# Patient Record
Sex: FEMALE | Race: WHITE | NOT HISPANIC OR LATINO | Employment: OTHER | URBAN - METROPOLITAN AREA
[De-identification: names, ages, dates, MRNs, and addresses within clinical notes are randomized per-mention and may not be internally consistent; named-entity substitution may affect disease eponyms.]

---

## 2017-03-21 ENCOUNTER — OFFICE VISIT (OUTPATIENT)
Dept: PSYCHIATRY | Facility: HOSPITAL | Age: 62
End: 2017-03-21

## 2017-03-21 DIAGNOSIS — F43.10 POST TRAUMATIC STRESS DISORDER (PTSD): Primary | ICD-10-CM

## 2017-03-21 PROCEDURE — 90834 PSYTX W PT 45 MINUTES: CPT | Performed by: COUNSELOR

## 2017-03-21 NOTE — PROGRESS NOTES
"Pt had 1:1 session [id number]. Pt's affect was very sad and tearful. Pt discussed how family members were treating her eg felt she was taking sides with her soon to be ex-hus and saying mean things to her. Discussed how it was okay to set boundaries with family members. Pt stated she tries to stay active, but has difficulty at times. Discussed if pt thought she needed meds to help. Pt stated she didn't think so. Pt stated she is not isolating or having difficulty getting out of be, but does cry. Pt stated she attended the  Depression Group, but did not feel that was for her. Pt agreed to check out a divorce support group. Reinforced the need for pt to surround herself with others that are helpful and supportive. Pt stated she has moved in with her da and that has helped. Pt rated PTSD a \"7\" (1=best). Pt stated she does exercise and journal. Pt stated there are times she wished \"she wasn't alive,\" but no plans to hurt herself, one of more she \"doesn't want to have to deal with all of this.\" Discussed EMDR. Informed pt this office was closing. Pt stated she wanted to continue and do EMDR. Scheduled next session 3/27 @ Noon.    "

## 2017-03-21 NOTE — TREATMENT PLAN
Multi-Disciplinary Problems (from Behavioral Health Treatment Plan)    Active Problems     Problem: Post Traumatic Stress (Priority: --)  (Start Date: 03/21/17) (Resolve Date: --)    Problem Details:  The patient self-scales this problem as a 7 with 10 being the worst.         Goal Start Date End Date    Patient will process and move through trauma in a way that improves self regard and the patients ability to function optimally in the world around them. 03/21/17 --    Goal Details:  Progress toward goal:  Not appropriate to rate progress toward goal since this is the initial treatment plan.         Goal Intervention Frequency Start Date End Date    Assist patient in identifying ways that trauma has negatively impacted their view of themselves and the world. Weekly 03/21/17 --    Intervention Details:  Duration of treatment until until discharged.         Goal Intervention Frequency Start Date End Date    Process trauma in the context of the safe session environment. Weekly 03/21/17 --    Intervention Details:  Duration of treatment until until discharged.         Goal Intervention Frequency Start Date End Date    Develop a plan of behavior changes that will reduce the stress of the trauma. Weekly 03/21/17 --    Intervention Details:  Duration of treatment until until discharged.                            I have discussed and reviewed this treatment plan with the patient.  It has been printed for signatures.

## 2017-03-27 ENCOUNTER — APPOINTMENT (OUTPATIENT)
Dept: PSYCHIATRY | Facility: HOSPITAL | Age: 62
End: 2017-03-27

## 2017-03-29 ENCOUNTER — APPOINTMENT (OUTPATIENT)
Dept: PSYCHIATRY | Facility: HOSPITAL | Age: 62
End: 2017-03-29

## 2017-04-18 ENCOUNTER — APPOINTMENT (OUTPATIENT)
Dept: PSYCHIATRY | Facility: HOSPITAL | Age: 62
End: 2017-04-18

## 2018-10-15 ENCOUNTER — TRANSCRIBE ORDERS (OUTPATIENT)
Dept: ADMINISTRATIVE | Facility: HOSPITAL | Age: 63
End: 2018-10-15

## 2018-10-15 ENCOUNTER — HOSPITAL ENCOUNTER (OUTPATIENT)
Dept: GENERAL RADIOLOGY | Facility: HOSPITAL | Age: 63
Discharge: HOME | End: 2018-10-15
Admitting: INTERNAL MEDICINE

## 2018-10-15 DIAGNOSIS — M79.671 RIGHT FOOT PAIN: ICD-10-CM

## 2018-10-15 DIAGNOSIS — M79.671 RIGHT FOOT PAIN: Primary | ICD-10-CM

## 2018-10-15 PROCEDURE — 73630 X-RAY EXAM OF FOOT: CPT

## 2019-06-12 ENCOUNTER — OFFICE VISIT (OUTPATIENT)
Dept: FAMILY MEDICINE CLINIC | Age: 64
End: 2019-06-12
Payer: COMMERCIAL

## 2019-06-12 VITALS
BODY MASS INDEX: 36.7 KG/M2 | HEART RATE: 72 BPM | HEIGHT: 64 IN | OXYGEN SATURATION: 97 % | DIASTOLIC BLOOD PRESSURE: 70 MMHG | SYSTOLIC BLOOD PRESSURE: 128 MMHG | WEIGHT: 215 LBS | TEMPERATURE: 98.5 F

## 2019-06-12 DIAGNOSIS — I10 HYPERTENSION, UNSPECIFIED TYPE: ICD-10-CM

## 2019-06-12 DIAGNOSIS — G89.29 CHRONIC PAIN OF RIGHT KNEE: Primary | ICD-10-CM

## 2019-06-12 DIAGNOSIS — Z00.00 PHYSICAL EXAM: ICD-10-CM

## 2019-06-12 DIAGNOSIS — F32.A DEPRESSION, UNSPECIFIED DEPRESSION TYPE: ICD-10-CM

## 2019-06-12 DIAGNOSIS — M25.561 CHRONIC PAIN OF RIGHT KNEE: Primary | ICD-10-CM

## 2019-06-12 PROBLEM — R68.89 HEAT INTOLERANCE: Status: ACTIVE | Noted: 2018-01-03

## 2019-06-12 PROBLEM — E55.9 VITAMIN D DEFICIENCY: Status: ACTIVE | Noted: 2018-01-03

## 2019-06-12 PROBLEM — Z78.0 POSTMENOPAUSAL: Status: ACTIVE | Noted: 2018-01-03

## 2019-06-12 PROBLEM — F51.01 PRIMARY INSOMNIA: Status: ACTIVE | Noted: 2018-01-03

## 2019-06-12 PROCEDURE — G0444 DEPRESSION SCREEN ANNUAL: HCPCS | Performed by: NURSE PRACTITIONER

## 2019-06-12 PROCEDURE — 99204 OFFICE O/P NEW MOD 45 MIN: CPT | Performed by: NURSE PRACTITIONER

## 2019-06-12 RX ORDER — LISINOPRIL AND HYDROCHLOROTHIAZIDE 20; 12.5 MG/1; MG/1
1 TABLET ORAL DAILY
Qty: 30 TABLET | Refills: 3 | Status: SHIPPED | OUTPATIENT
Start: 2019-06-12 | End: 2019-12-30 | Stop reason: SDUPTHER

## 2019-06-12 RX ORDER — FLUTICASONE PROPIONATE 50 MCG
SPRAY, SUSPENSION (ML) NASAL
COMMUNITY

## 2019-06-12 RX ORDER — LISINOPRIL AND HYDROCHLOROTHIAZIDE 20; 12.5 MG/1; MG/1
1 TABLET ORAL
COMMUNITY
End: 2019-06-12 | Stop reason: SDUPTHER

## 2019-06-12 SDOH — ECONOMIC STABILITY: FOOD INSECURITY: WITHIN THE PAST 12 MONTHS, YOU WORRIED THAT YOUR FOOD WOULD RUN OUT BEFORE YOU GOT MONEY TO BUY MORE.: NEVER TRUE

## 2019-06-12 SDOH — ECONOMIC STABILITY: INCOME INSECURITY: HOW HARD IS IT FOR YOU TO PAY FOR THE VERY BASICS LIKE FOOD, HOUSING, MEDICAL CARE, AND HEATING?: NOT HARD AT ALL

## 2019-06-12 SDOH — ECONOMIC STABILITY: TRANSPORTATION INSECURITY
IN THE PAST 12 MONTHS, HAS LACK OF TRANSPORTATION KEPT YOU FROM MEETINGS, WORK, OR FROM GETTING THINGS NEEDED FOR DAILY LIVING?: NO

## 2019-06-12 SDOH — ECONOMIC STABILITY: TRANSPORTATION INSECURITY
IN THE PAST 12 MONTHS, HAS THE LACK OF TRANSPORTATION KEPT YOU FROM MEDICAL APPOINTMENTS OR FROM GETTING MEDICATIONS?: NO

## 2019-06-12 SDOH — ECONOMIC STABILITY: FOOD INSECURITY: WITHIN THE PAST 12 MONTHS, THE FOOD YOU BOUGHT JUST DIDN'T LAST AND YOU DIDN'T HAVE MONEY TO GET MORE.: NEVER TRUE

## 2019-06-12 ASSESSMENT — PATIENT HEALTH QUESTIONNAIRE - PHQ9
2. FEELING DOWN, DEPRESSED OR HOPELESS: 2
8. MOVING OR SPEAKING SO SLOWLY THAT OTHER PEOPLE COULD HAVE NOTICED. OR THE OPPOSITE, BEING SO FIGETY OR RESTLESS THAT YOU HAVE BEEN MOVING AROUND A LOT MORE THAN USUAL: 1
SUM OF ALL RESPONSES TO PHQ9 QUESTIONS 1 & 2: 3
7. TROUBLE CONCENTRATING ON THINGS, SUCH AS READING THE NEWSPAPER OR WATCHING TELEVISION: 1
4. FEELING TIRED OR HAVING LITTLE ENERGY: 1
1. LITTLE INTEREST OR PLEASURE IN DOING THINGS: 1
5. POOR APPETITE OR OVEREATING: 2
3. TROUBLE FALLING OR STAYING ASLEEP: 1
SUM OF ALL RESPONSES TO PHQ QUESTIONS 1-9: 10
SUM OF ALL RESPONSES TO PHQ QUESTIONS 1-9: 10
9. THOUGHTS THAT YOU WOULD BE BETTER OFF DEAD, OR OF HURTING YOURSELF: 0
6. FEELING BAD ABOUT YOURSELF - OR THAT YOU ARE A FAILURE OR HAVE LET YOURSELF OR YOUR FAMILY DOWN: 1

## 2019-06-12 ASSESSMENT — ANXIETY QUESTIONNAIRES
4. TROUBLE RELAXING: 1-SEVERAL DAYS
GAD7 TOTAL SCORE: 8
2. NOT BEING ABLE TO STOP OR CONTROL WORRYING: 2-OVER HALF THE DAYS
1. FEELING NERVOUS, ANXIOUS, OR ON EDGE: 1-SEVERAL DAYS
3. WORRYING TOO MUCH ABOUT DIFFERENT THINGS: 2-OVER HALF THE DAYS
7. FEELING AFRAID AS IF SOMETHING AWFUL MIGHT HAPPEN: 0-NOT AT ALL SURE
5. BEING SO RESTLESS THAT IT IS HARD TO SIT STILL: 1-SEVERAL DAYS
6. BECOMING EASILY ANNOYED OR IRRITABLE: 1-SEVERAL DAYS

## 2019-06-12 ASSESSMENT — ENCOUNTER SYMPTOMS: BACK PAIN: 1

## 2019-06-12 NOTE — PROGRESS NOTES
Patient ID: Rc Sainz is a 58 y.o. female who presents today for a Physical Exam.      HPI-this is a 58-year-old female patient wanting to establish care here today. She stated is been a while since she has been to the doctor. She told me she moved here a year ago from [place]. She has several health conditions:    1. She told me she has chronic right knee pain and has a previous torn meniscus that had to be repaired years ago. She has a history of previous gel injections to the knee and she is stating that she is needing another injection. I am going to be referring her to our Ortho and letting them decide what the appropriate treatment will be for her. She did tell me she takes Aleve for the pain but the pain is getting so bad she can hardly walk. 2.  She will be needing a physical exam today and has been a while since she has had labs so I will be ordering those for future she is not fasting today she stated she can come in at a future date to get the labs drawn. 3.  Hypertension- history of hypertension for many years she currently takes lisinopril 20 mg hydrochlorothiazide 12.5 mg 1 tablet daily. She states she is in control with her high blood pressure and has no issues. She denies any side effects from the medication and her blood pressure is good today. 4.  Depression-she also has a history of depression she currently takes Trintellix 10 mg daily but she told me she has been cutting the pill in half and taking a half a pill daily. She is recently  within the last year and she said is been rather tough. She does want to continue to keep decreasing the dose of this and I told her this is fine she will have to just see how she does so I told her she can cut the half of the half of the pill and just take a quarter of that and see how she does. We did the PHQ 9 and she is in the moderate level of depression. She does have anxiety she said she is a worry wart.     We None     Attends meetings of clubs or organizations: None     Relationship status: None    Intimate partner violence:     Fear of current or ex partner: None     Emotionally abused: None     Physically abused: None     Forced sexual activity: None   Other Topics Concern    None   Social History Narrative    None          Allergies   Allergen Reactions    Penicillins Hives    Sulfa Antibiotics Other (See Comments)     \"nodules\" on the legs. Current Outpatient Medications   Medication Sig Dispense Refill    fluticasone (FLONASE) 50 MCG/ACT nasal spray by Nasal route      lisinopril-hydrochlorothiazide (PRINZIDE;ZESTORETIC) 20-12.5 MG per tablet Take 1 tablet by mouth daily 30 tablet 3    VORTIoxetine (TRINTELLIX) 10 MG TABS tablet Take 1 tablet by mouth daily 30 tablet 0     No current facility-administered medications for this visit. The patient's past medical history, past surgical history, family history, medications, and allergies were all reviewed and updated at appropriate today. Review of Systems   Musculoskeletal: Positive for arthralgias, back pain, joint swelling (right knee at times) and myalgias. Psychiatric/Behavioral: Positive for dysphoric mood. All other systems reviewed and are negative. Physical Exam   Constitutional: She is oriented to person, place, and time. She appears well-developed and well-nourished. HENT:   Head: Normocephalic. Right Ear: External ear normal.   Left Ear: External ear normal.   Nose: Nose normal.   Mouth/Throat: Oropharynx is clear and moist.   Eyes: Pupils are equal, round, and reactive to light. Conjunctivae and EOM are normal.   Neck: Trachea normal and normal range of motion. Neck supple. No JVD present. Carotid bruit is not present. No thyromegaly present. Cardiovascular: Normal rate, regular rhythm and normal heart sounds. Pulmonary/Chest: Effort normal and breath sounds normal.   Abdominal: Soft.  Bowel sounds are normal. Musculoskeletal: Normal range of motion. Lymphadenopathy:     She has no cervical adenopathy. Neurological: She is alert and oriented to person, place, and time. She displays no tremor. Reflex Scores:       Patellar reflexes are 1+ on the right side and 1+ on the left side. Skin: Skin is warm and dry. Psychiatric: She has a normal mood and affect. Her behavior is normal. Judgment and thought content normal.   Nursing note and vitals reviewed. Assessment:  Encounter Diagnoses   Name Primary?  Chronic pain of right knee Yes    Physical exam     Hypertension, unspecified type     Depression, unspecified depression type        Controlled Substances Monitoring:  NA    Plan:  1. Chronic pain of right knee  - Charlotte Desai MD, Orthopedic Surgery, [facility]    2. Physical exam  - CBC Auto Differential; Future  - Comprehensive Metabolic Panel; Future  - Hemoglobin A1C; Future  - Hepatitis C Antibody; Future  - HIV Screen; Future  - Lipid Panel; Future  - TSH with Reflex; Future  - Vitamin D 25 Hydroxy; Future    3. Hypertension, unspecified type  - lisinopril-hydrochlorothiazide (PRINZIDE;ZESTORETIC) 20-12.5 MG per tablet; Take 1 tablet by mouth daily  Dispense: 30 tablet; Refill: 3    4. Depression, unspecified depression type  - VORTIoxetine (TRINTELLIX) 10 MG TABS tablet; Take 1 tablet by mouth daily  Dispense: 30 tablet; Refill: 0      Encouraged healthy diet, regular exercise and multivitamin daily. Labs checked per orders. Optho visit q 1-2 years. Colonoscopy if over age 48 or if family history was discussed, but she has had the [place]-guard last year. Pap encouraged yearly, mammo encouraged yearly.

## 2019-06-17 ENCOUNTER — OFFICE VISIT (OUTPATIENT)
Dept: ORTHOPEDIC SURGERY | Age: 64
End: 2019-06-17
Payer: COMMERCIAL

## 2019-06-17 VITALS
SYSTOLIC BLOOD PRESSURE: 125 MMHG | HEIGHT: 64 IN | HEART RATE: 76 BPM | DIASTOLIC BLOOD PRESSURE: 80 MMHG | BODY MASS INDEX: 36.7 KG/M2 | WEIGHT: 215 LBS

## 2019-06-17 DIAGNOSIS — M25.561 RIGHT KNEE PAIN, UNSPECIFIED CHRONICITY: ICD-10-CM

## 2019-06-17 DIAGNOSIS — M17.11 OSTEOARTHRITIS OF RIGHT KNEE, UNSPECIFIED OSTEOARTHRITIS TYPE: Primary | ICD-10-CM

## 2019-06-17 PROCEDURE — 20610 DRAIN/INJ JOINT/BURSA W/O US: CPT | Performed by: ORTHOPAEDIC SURGERY

## 2019-06-17 PROCEDURE — 99204 OFFICE O/P NEW MOD 45 MIN: CPT | Performed by: ORTHOPAEDIC SURGERY

## 2019-06-17 NOTE — PROGRESS NOTES
Date:  2019    Name:  Severiano Crete  Address:  [address] [facility] [address]    :  1955      Age:   58 y.o.    SSN:  [id number]      Medical Record Number:  [id number]    Reason for Visit:    Chief Complaint    Knee Pain (np right knee. 2nd opinion. )      DOS:2019     HPI: Severiano Crete is a 58 y.o. female here today for here today for evaluation of right knee. About 2 years ago she had meniscus surgery on her right knee by another surgeon and has had persistent right medial sided pain since. The pain has been progressively worsening. The knee pain is worse with stairs, long walks, and pressure. She also is getting popping and cracking in her knee along with occasional buckling, locking, and catching. She was told her current symptoms are from osteoarthritis and has been treated with gel injection 2018 and intraarticular cortisone injection. Despite conservative treatment her knee pain occasionally wakes her up at night. She has been taking so much aleve recently that it's starting to upset her stomach. Pain Assessment  Location of Pain: Knee  Location Modifiers: Right  Severity of Pain: 10  Quality of Pain: Aching  Duration of Pain: Persistent  Frequency of Pain: Intermittent  Aggravating Factors: Walking(quick movement )  Limiting Behavior: Yes  Relieving Factors: Rest  Result of Injury: No  Work-Related Injury: No  Are there other pain locations you wish to document?: No  ROS: Review of systems reviewed from Patient History Form completed today and available in the patient's chart under the Media tab.        Past Medical History:   Diagnosis Date    Allergic rhinitis     Depression     HTN (hypertension)         Past Surgical History:   Procedure Laterality Date    HERNIA REPAIR      HYSTERECTOMY      KNEE CARTILAGE SURGERY      TONSILLECTOMY         Family History   Problem Relation Age of Onset    Heart Disease Mother         DM type 2    Cancer Father esophogus    Heart Disease Brother     Heart Disease Maternal Grandmother     Heart Disease Maternal Grandfather     Cancer Paternal Grandmother     No Known Problems Paternal Grandfather        Social History     Socioeconomic History    Marital status:      Spouse name: None    Number of children: 3    Years of education: None    Highest education level: None   Occupational History    Occupation: retired   Social Needs    Financial resource strain: Not hard at all   [place] insecurity:     Worry: Never true     Inability: Never true    Transportation needs:     Medical: No     Non-medical: No   Tobacco Use    Smoking status: Never Smoker    Smokeless tobacco: Never Used   Substance and Sexual Activity    Alcohol use: Yes     Alcohol/week: 0.6 - 1.2 oz     Types: 1 - 2 Glasses of wine per week     Comment: occasional    Drug use: Never    Sexual activity: Not Currently   Lifestyle    Physical activity:     Days per week: None     Minutes per session: None    Stress: None   Relationships    Social connections:     Talks on phone: None     Gets together: None     Attends [employer] service: None     Active member of club or organization: None     Attends meetings of clubs or organizations: None     Relationship status: None    Intimate partner violence:     Fear of current or ex partner: None     Emotionally abused: None     Physically abused: None     Forced sexual activity: None   Other Topics Concern    None   Social History Narrative    None       Current Outpatient Medications   Medication Sig Dispense Refill    fluticasone (FLONASE) 50 MCG/ACT nasal spray by Nasal route      lisinopril-hydrochlorothiazide (PRINZIDE;ZESTORETIC) 20-12.5 MG per tablet Take 1 tablet by mouth daily 30 tablet 3    VORTIoxetine (TRINTELLIX) 10 MG TABS tablet Take 1 tablet by mouth daily 30 tablet 0     No current facility-administered medications for this visit.         Allergies   Allergen Reactions    Special tests: Negative Olivia sign. Diagnostics:  Radiology:       Pertinent imaging reviewed, images only - no report available. Radiographs were obtained and reviewed in the office; 4 views: bilateral PA, bilateral Lizbeth Mari, bilateral [employer] AND RIGHT lateral    Impression: right knee medial compartment joint space narrowing with bony spur formation      Assessment: 57yo female with right knee osteoarthritis. Plan: The nature and natural history of osteoarthritis was discussed in detail the patient today. Treatment options both surgical and nonsurgical were discussed in detail. Patient was counseled with regard to the importance of activity modification as well as weight control. The role for medications, intra-articular injections as well as surgery were discussed. Patient's questions were answered.     Believe patient is a candidate for right knee intraarticular cortisone injection followed home exercises/physical therapy. Home exercises and post injection care sheet were provided. Followup in 4 weeks or sooner if needed. Markbraydenashley Ronquillo is in agreement with this plan. All questions were answered to patient's satisfaction and was encouraged to call with any further questions. The indications and risks of steroid injection as well as treatment alternatives were discussed with the patient who consented to the procedure. Under sterile conditions and after informed consent was obtained the patient was given an injection into the RIGHT  knee. 2cc 40 mg of Depo-Medrol and 4 mL of 1% lidocaine were placed in the knee after it was prepped with chlorhexidine. This resulted in good relief of symptoms. There were no complications. The patient was advised to ice the knee this evening and to avoid vigorous activities for the next 2 days. They were advised to call us if there was any erythema, enduration, swelling or increasing pain.         Orders Placed This Encounter   Procedures    XR KNEE that there are still dictated errors within this office note. If so, please bring any errors to my attention for an addendum. All efforts were made to ensure that this office note is accurate.

## 2019-06-17 NOTE — LETTER
Patient Name: Rc Sainz MRN: [id number]  DOS: 6/17/2019     Diagnosis:   1. Osteoarthritis of right knee, unspecified osteoarthritis type    2. Right knee pain, unspecified chronicity                                                     Goal:  [x]Decrease Pain and/or Swelling [x]Increase ROM and/or Flexibility     [x]Increase Function   [x]Increase Strength and/or Endurance   []Other     Evaluation:  [x]Evaluation and Treatment []KT-1000 []Isokinetic Exam []Preoperative Eval    Recommended Modalities:  [x]Modalities of Choice      []HCVS            []Electrical Stimulation     [] Remove Dressing  []Ultrasound        []TENS/TNS    [] Lumbar Traction           [] Cervical Traction  []Phonophoresis         []Hot Pack/Cold Pack   []PT Treatment, Unlisted []Other:    Therapeutic Exercises:    []Isometrics    []Range of Motion []Progressive Exer. []Balance Coordination   []Flexibility  []ROM Limited  []Total Hip Replacement   []Passive  []ROM Full   []Total Knee Replacement   []Active Assisted    []Shoulder Impingement Prog  []Active   []Tennis Elbow Program   []Capsular Shift Regular        []Isokinetics                     []Spine Program   [x]Straight Leg Raises  [] Gait  []Fixation    [] Supine    [] Running    [] Extension    [] Prone   [] Throwing   [] Stabilization   [] AB    [] Swiss Kenzie Bread    [] AD      [] Spine Eval   [] Cervical Eval  [] Conditioning   [] Lumbar    [] Stationary Bike   [] Lumbar Exer.   [] Stairmaster   [] Functional Cap   [] Pleasure Bend Track   [] Return to work   [] Treadmill  []Other :     [] Aquatic Prog.      Treatment Program:  Frequency: [] 1x  [x] 2x  [] 3x  [] 4x  [] 5x week  Duration: [x] 1  [] 2  [] 3  [] 4  [] 5 month   Weight Bearing: [] Non  [] 1/4  [] 1/2  [] 3/4  [] Full  ROM: [] Restricted  [] Full  [x] Follow established: knee OA       [] Other:

## 2019-06-25 ENCOUNTER — TELEPHONE (OUTPATIENT)
Dept: FAMILY MEDICINE CLINIC | Age: 64
End: 2019-06-25

## 2019-06-25 DIAGNOSIS — Z20.818 EXPOSURE TO PERTUSSIS: Primary | ICD-10-CM

## 2019-06-25 RX ORDER — AZITHROMYCIN 250 MG/1
250 TABLET, FILM COATED ORAL SEE ADMIN INSTRUCTIONS
Qty: 6 TABLET | Refills: 0 | Status: SHIPPED | OUTPATIENT
Start: 2019-06-25 | End: 2019-06-30

## 2019-06-25 NOTE — TELEPHONE ENCOUNTER
Patient called requesting a \"z-pack or something\"  Stated that her granddaughter was positive for pertussis and the pediatrician recommended her bieng treated.

## 2019-08-20 ENCOUNTER — TELEPHONE (OUTPATIENT)
Dept: FAMILY MEDICINE CLINIC | Age: 64
End: 2019-08-20

## 2019-08-20 NOTE — TELEPHONE ENCOUNTER
Patient called to have prescriptions transferred to [facility] due to insurance called [facility] and spoke with Chela she will call [facility] and have medication (lisinopril) transferred .

## 2019-12-30 DIAGNOSIS — I10 HYPERTENSION, UNSPECIFIED TYPE: ICD-10-CM

## 2019-12-30 RX ORDER — LISINOPRIL AND HYDROCHLOROTHIAZIDE 20; 12.5 MG/1; MG/1
1 TABLET ORAL DAILY
Qty: 30 TABLET | Refills: 0 | Status: SHIPPED | OUTPATIENT
Start: 2019-12-30 | End: 2020-01-06 | Stop reason: SDUPTHER

## 2020-01-06 ENCOUNTER — OFFICE VISIT (OUTPATIENT)
Dept: FAMILY MEDICINE CLINIC | Age: 65
End: 2020-01-06
Payer: MEDICARE

## 2020-01-06 VITALS
OXYGEN SATURATION: 98 % | HEART RATE: 82 BPM | BODY MASS INDEX: 35.68 KG/M2 | DIASTOLIC BLOOD PRESSURE: 70 MMHG | TEMPERATURE: 98.2 F | WEIGHT: 209 LBS | HEIGHT: 64 IN | SYSTOLIC BLOOD PRESSURE: 130 MMHG

## 2020-01-06 LAB
BASOPHILS ABSOLUTE: 0 K/UL (ref 0–0.2)
BASOPHILS RELATIVE PERCENT: 1.1 %
EOSINOPHILS ABSOLUTE: 0 K/UL (ref 0–0.6)
EOSINOPHILS RELATIVE PERCENT: 1.2 %
HCT VFR BLD CALC: 40.6 % (ref 36–48)
HEMOGLOBIN: 14.1 G/DL (ref 12–16)
LYMPHOCYTES ABSOLUTE: 1.6 K/UL (ref 1–5.1)
LYMPHOCYTES RELATIVE PERCENT: 48.8 %
MCH RBC QN AUTO: 30.7 PG (ref 26–34)
MCHC RBC AUTO-ENTMCNC: 34.7 G/DL (ref 31–36)
MCV RBC AUTO: 88.6 FL (ref 80–100)
MONOCYTES ABSOLUTE: 0.3 K/UL (ref 0–1.3)
MONOCYTES RELATIVE PERCENT: 9.2 %
NEUTROPHILS ABSOLUTE: 1.3 K/UL (ref 1.7–7.7)
NEUTROPHILS RELATIVE PERCENT: 39.7 %
PDW BLD-RTO: 12.3 % (ref 12.4–15.4)
PLATELET # BLD: 214 K/UL (ref 135–450)
PMV BLD AUTO: 8.6 FL (ref 5–10.5)
RBC # BLD: 4.58 M/UL (ref 4–5.2)
WBC # BLD: 3.4 K/UL (ref 4–11)

## 2020-01-06 PROCEDURE — G8400 PT W/DXA NO RESULTS DOC: HCPCS | Performed by: NURSE PRACTITIONER

## 2020-01-06 PROCEDURE — G8417 CALC BMI ABV UP PARAM F/U: HCPCS | Performed by: NURSE PRACTITIONER

## 2020-01-06 PROCEDURE — 36415 COLL VENOUS BLD VENIPUNCTURE: CPT | Performed by: NURSE PRACTITIONER

## 2020-01-06 PROCEDURE — 1123F ACP DISCUSS/DSCN MKR DOCD: CPT | Performed by: NURSE PRACTITIONER

## 2020-01-06 PROCEDURE — G8427 DOCREV CUR MEDS BY ELIG CLIN: HCPCS | Performed by: NURSE PRACTITIONER

## 2020-01-06 PROCEDURE — 99214 OFFICE O/P EST MOD 30 MIN: CPT | Performed by: NURSE PRACTITIONER

## 2020-01-06 PROCEDURE — 3017F COLORECTAL CA SCREEN DOC REV: CPT | Performed by: NURSE PRACTITIONER

## 2020-01-06 PROCEDURE — 1036F TOBACCO NON-USER: CPT | Performed by: NURSE PRACTITIONER

## 2020-01-06 PROCEDURE — 4040F PNEUMOC VAC/ADMIN/RCVD: CPT | Performed by: NURSE PRACTITIONER

## 2020-01-06 PROCEDURE — 1090F PRES/ABSN URINE INCON ASSESS: CPT | Performed by: NURSE PRACTITIONER

## 2020-01-06 PROCEDURE — G8484 FLU IMMUNIZE NO ADMIN: HCPCS | Performed by: NURSE PRACTITIONER

## 2020-01-06 RX ORDER — AZITHROMYCIN 250 MG/1
250 TABLET, FILM COATED ORAL SEE ADMIN INSTRUCTIONS
Qty: 6 TABLET | Refills: 0 | Status: SHIPPED | OUTPATIENT
Start: 2020-01-06 | End: 2020-01-11

## 2020-01-06 RX ORDER — LISINOPRIL AND HYDROCHLOROTHIAZIDE 20; 12.5 MG/1; MG/1
1 TABLET ORAL DAILY
Qty: 30 TABLET | Refills: 0 | Status: SHIPPED | OUTPATIENT
Start: 2020-01-06 | End: 2020-02-18 | Stop reason: SDUPTHER

## 2020-01-06 RX ORDER — ONDANSETRON 4 MG/1
4 TABLET, FILM COATED ORAL EVERY 12 HOURS PRN
Qty: 15 TABLET | Refills: 0 | Status: SHIPPED | OUTPATIENT
Start: 2020-01-06

## 2020-01-06 ASSESSMENT — ENCOUNTER SYMPTOMS
COUGH: 1
RHINORRHEA: 1
SINUS PAIN: 1
NAUSEA: 1
CHEST TIGHTNESS: 1
SINUS PRESSURE: 1

## 2020-01-06 NOTE — PROGRESS NOTES
nasal spray by Nasal route       No current facility-administered medications for this visit. Patient's past medical history, surgical history, family history, medications,  and allergies  were all reviewed and updated as appropriate today. Review of Systems   Constitutional: Positive for chills (beginning) and fatigue. HENT: Positive for congestion, rhinorrhea (clear, yellow and green drainage), sinus pressure (slight) and sinus pain (slight). Respiratory: Positive for cough and chest tightness. Gastrointestinal: Positive for nausea. All other systems reviewed and are negative. Physical Exam  Vitals signs and nursing note reviewed. Constitutional:       Appearance: Normal appearance. She is well-developed. HENT:      Head: Normocephalic. Right Ear: Tympanic membrane and external ear normal.      Left Ear: Tympanic membrane and external ear normal.      Nose: Nasal tenderness, mucosal edema, congestion and rhinorrhea present. Right Turbinates: Enlarged and swollen. Left Turbinates: Enlarged and swollen. Right Sinus: Maxillary sinus tenderness and frontal sinus tenderness present. Left Sinus: Maxillary sinus tenderness and frontal sinus tenderness present. Mouth/Throat:      Pharynx: Posterior oropharyngeal erythema present. Eyes:      Conjunctiva/sclera: Conjunctivae normal.      Pupils: Pupils are equal, round, and reactive to light. Neck:      Musculoskeletal: Normal range of motion and neck supple. Thyroid: No thyromegaly. Cardiovascular:      Rate and Rhythm: Normal rate and regular rhythm. Heart sounds: Normal heart sounds. No murmur. No friction rub. No gallop. Pulmonary:      Effort: Pulmonary effort is normal. No respiratory distress. Breath sounds: Normal breath sounds. No wheezing. Abdominal:      General: Bowel sounds are normal.      Palpations: Abdomen is soft. There is no mass. Tenderness:  There is no

## 2020-01-07 LAB
A/G RATIO: 1.8 (ref 1.1–2.2)
ALBUMIN SERPL-MCNC: 4.8 G/DL (ref 3.4–5)
ALP BLD-CCNC: 79 U/L (ref 40–129)
ALT SERPL-CCNC: 20 U/L (ref 10–40)
ANION GAP SERPL CALCULATED.3IONS-SCNC: 19 MMOL/L (ref 3–16)
AST SERPL-CCNC: 23 U/L (ref 15–37)
BILIRUB SERPL-MCNC: 0.3 MG/DL (ref 0–1)
BUN BLDV-MCNC: 19 MG/DL (ref 7–20)
CALCIUM SERPL-MCNC: 10 MG/DL (ref 8.3–10.6)
CHLORIDE BLD-SCNC: 95 MMOL/L (ref 99–110)
CHOLESTEROL, TOTAL: 212 MG/DL (ref 0–199)
CO2: 23 MMOL/L (ref 21–32)
CREAT SERPL-MCNC: 0.7 MG/DL (ref 0.6–1.2)
ESTIMATED AVERAGE GLUCOSE: 119.8 MG/DL
GFR AFRICAN AMERICAN: >60
GFR NON-AFRICAN AMERICAN: >60
GLOBULIN: 2.6 G/DL
GLUCOSE BLD-MCNC: 102 MG/DL (ref 70–99)
HBA1C MFR BLD: 5.8 %
HDLC SERPL-MCNC: 31 MG/DL (ref 40–60)
HEPATITIS C ANTIBODY INTERPRETATION: NORMAL
HIV AG/AB: NORMAL
HIV ANTIGEN: NORMAL
HIV-1 ANTIBODY: NORMAL
HIV-2 AB: NORMAL
LDL CHOLESTEROL CALCULATED: 124 MG/DL
POTASSIUM SERPL-SCNC: 4.1 MMOL/L (ref 3.5–5.1)
SODIUM BLD-SCNC: 137 MMOL/L (ref 136–145)
TOTAL PROTEIN: 7.4 G/DL (ref 6.4–8.2)
TRIGL SERPL-MCNC: 283 MG/DL (ref 0–150)
TSH REFLEX: 1.19 UIU/ML (ref 0.27–4.2)
VITAMIN D 25-HYDROXY: 42.8 NG/ML
VLDLC SERPL CALC-MCNC: 57 MG/DL

## 2020-02-18 RX ORDER — LISINOPRIL AND HYDROCHLOROTHIAZIDE 20; 12.5 MG/1; MG/1
1 TABLET ORAL DAILY
Qty: 30 TABLET | Refills: 0 | Status: SHIPPED | OUTPATIENT
Start: 2020-02-18 | End: 2020-02-20 | Stop reason: SDUPTHER

## 2020-02-18 NOTE — TELEPHONE ENCOUNTER
older using the  MDRD formula (not corrected for weight), is valid for stable  renal function.  GFR  01/06/2020 >60  >60 Final    Comment: Chronic Kidney Disease: less than 60 ml/min/1.73 sq.m. Kidney Failure: less than 15 ml/min/1.73 sq.m. Results valid for patients 18 years and older.       Calcium 01/06/2020 10.0  8.3 - 10.6 mg/dL Final    Total Protein 01/06/2020 7.4  6.4 - 8.2 g/dL Final    Alb 01/06/2020 4.8  3.4 - 5.0 g/dL Final    Albumin/Globulin Ratio 01/06/2020 1.8  1.1 - 2.2 Final    Total Bilirubin 01/06/2020 0.3  0.0 - 1.0 mg/dL Final    Alkaline Phosphatase 01/06/2020 79  40 - 129 U/L Final    ALT 01/06/2020 20  10 - 40 U/L Final    AST 01/06/2020 23  15 - 37 U/L Final    Globulin 01/06/2020 2.6  g/dL Final    WBC 01/06/2020 3.4* 4.0 - 11.0 K/uL Final    RBC 01/06/2020 4.58  4.00 - 5.20 M/uL Final    Hemoglobin 01/06/2020 14.1  12.0 - 16.0 g/dL Final    Hematocrit 01/06/2020 40.6  36.0 - 48.0 % Final    MCV 01/06/2020 88.6  80.0 - 100.0 fL Final    MCH 01/06/2020 30.7  26.0 - 34.0 pg Final    MCHC 01/06/2020 34.7  31.0 - 36.0 g/dL Final    RDW 01/06/2020 12.3* 12.4 - 15.4 % Final    Platelets 08/54/0887 214  135 - 450 K/uL Final    MPV 01/06/2020 8.6  5.0 - 10.5 fL Final    Neutrophils % 01/06/2020 39.7  % Final    Lymphocytes % 01/06/2020 48.8  % Final    Monocytes % 01/06/2020 9.2  % Final    Eosinophils % 01/06/2020 1.2  % Final    Basophils % 01/06/2020 1.1  % Final    Neutrophils Absolute 01/06/2020 1.3* 1.7 - 7.7 K/uL Final    Lymphocytes Absolute 01/06/2020 1.6  1.0 - 5.1 K/uL Final    Monocytes Absolute 01/06/2020 0.3  0.0 - 1.3 K/uL Final    Eosinophils Absolute 01/06/2020 0.0  0.0 - 0.6 K/uL Final    Basophils Absolute 01/06/2020 0.0  0.0 - 0.2 K/uL Final

## 2020-02-20 RX ORDER — LISINOPRIL AND HYDROCHLOROTHIAZIDE 20; 12.5 MG/1; MG/1
1 TABLET ORAL DAILY
Qty: 90 TABLET | Refills: 0 | Status: SHIPPED | OUTPATIENT
Start: 2020-02-20 | End: 2020-03-10 | Stop reason: SDUPTHER

## 2020-02-20 NOTE — TELEPHONE ENCOUNTER
Requested Prescriptions     Pending Prescriptions Disp Refills    lisinopril-hydroCHLOROthiazide (PRINZIDE;ZESTORETIC) 20-12.5 MG per tablet 90 tablet 1     Sig: Take 1 tablet by mouth daily     Last appointment: 1/6/2020  Next appointment: Visit date not found  Last refill: 2/18/2020 for 30 days

## 2020-03-10 RX ORDER — LISINOPRIL AND HYDROCHLOROTHIAZIDE 20; 12.5 MG/1; MG/1
1 TABLET ORAL DAILY
Qty: 90 TABLET | Refills: 3 | Status: SHIPPED | OUTPATIENT
Start: 2020-03-10

## 2020-03-10 NOTE — TELEPHONE ENCOUNTER
Last appointment: 1/6/2020  Next appointment: Visit date not found  Last refill:02/20/2020    [facility] patient cannot use [facility] with her insurance. She needs to use [facility]. Also patient requested refills be sent over for the year.   Please advise